# Patient Record
Sex: MALE | Race: WHITE | URBAN - METROPOLITAN AREA
[De-identification: names, ages, dates, MRNs, and addresses within clinical notes are randomized per-mention and may not be internally consistent; named-entity substitution may affect disease eponyms.]

---

## 2017-11-13 PROBLEM — F10.10 ALCOHOL ABUSE: Status: ACTIVE | Noted: 2017-11-13

## 2017-11-13 PROBLEM — Z72.0 TOBACCO ABUSE: Status: ACTIVE | Noted: 2017-11-13

## 2017-11-13 PROBLEM — R10.11 RUQ ABDOMINAL PAIN: Status: ACTIVE | Noted: 2017-11-13

## 2017-11-15 PROBLEM — K26.5 PERFORATED DUODENAL ULCER (HCC): Status: ACTIVE | Noted: 2017-11-15

## 2017-11-15 PROBLEM — K82.8 BILIARY DYSKINESIA: Status: ACTIVE | Noted: 2017-11-15

## 2017-11-27 ENCOUNTER — TELEPHONE (OUTPATIENT)
Dept: SURGERY | Age: 52
End: 2017-11-27

## 2017-11-27 RX ORDER — OXYCODONE HYDROCHLORIDE AND ACETAMINOPHEN 5; 325 MG/1; MG/1
1 TABLET ORAL EVERY 6 HOURS PRN
Qty: 20 TABLET | Refills: 0 | Status: SHIPPED | OUTPATIENT
Start: 2017-11-27

## 2017-11-29 ENCOUNTER — OFFICE VISIT (OUTPATIENT)
Dept: SURGERY | Age: 52
End: 2017-11-29

## 2017-11-29 VITALS
WEIGHT: 128 LBS | DIASTOLIC BLOOD PRESSURE: 78 MMHG | HEIGHT: 66 IN | BODY MASS INDEX: 20.57 KG/M2 | SYSTOLIC BLOOD PRESSURE: 116 MMHG | HEART RATE: 76 BPM

## 2017-11-29 DIAGNOSIS — F10.10 ALCOHOL ABUSE: ICD-10-CM

## 2017-11-29 DIAGNOSIS — Z72.0 TOBACCO ABUSE: ICD-10-CM

## 2017-11-29 DIAGNOSIS — K26.5 PERFORATED DUODENAL ULCER (HCC): Primary | ICD-10-CM

## 2017-11-29 PROCEDURE — 99024 POSTOP FOLLOW-UP VISIT: CPT | Performed by: SURGERY

## 2017-11-29 RX ORDER — OMEPRAZOLE 20 MG/1
20 TABLET, DELAYED RELEASE ORAL DAILY
Qty: 30 TABLET | Refills: 5 | Status: SHIPPED | OUTPATIENT
Start: 2017-11-29 | End: 2018-07-19 | Stop reason: SDUPTHER

## 2017-11-29 NOTE — PROGRESS NOTES
Subjective:      Patient ID: Albaro De La Paz is a 45 y.o. male. HPI  S/p repair perf duodenal ulcer, open kristi. Has been doing well. Took augmentin. Unable to fill protonix due to insurance issues. Has stopped smoking and drinking     Review of Systems    Objective:   Physical Exam  RUQ incision well healed. Small amount serous drainage from central wound    Assessment:      1. Perforated duodenal ulcer ([address]     2. Tobacco abuse     3. Alcohol abuse           Plan:      Generic omeprazole Rx given.   May need peer to peer  Needs at least 6 months of PPI  Continue smoking and EtOH abstinence  Cont light lifting another 4 weeks

## 2017-12-21 ENCOUNTER — TELEPHONE (OUTPATIENT)
Dept: SURGERY | Age: 52
End: 2017-12-21

## 2017-12-21 NOTE — TELEPHONE ENCOUNTER
Patient would like to have a call regarding his [employer] paperwork. He would like to know if it has been faxed yet.   He states he dropped it off of 12/13

## 2017-12-21 NOTE — TELEPHONE ENCOUNTER
Spoke with patient papers at the  notified will patient signature and contact billing department for itemized bill.

## 2018-01-08 ENCOUNTER — TELEPHONE (OUTPATIENT)
Dept: SURGERY | Age: 53
End: 2018-01-08

## 2018-02-13 ENCOUNTER — TELEPHONE (OUTPATIENT)
Dept: SURGERY | Age: 53
End: 2018-02-13

## 2018-02-13 NOTE — TELEPHONE ENCOUNTER
Spoke with [employer] dates added and re-faxed to them. Also spoke with patient wife notified forms were corrected and faxed.

## 2018-07-25 RX ORDER — OMEPRAZOLE 20 MG/1
CAPSULE, DELAYED RELEASE ORAL
Qty: 30 CAPSULE | Refills: 4 | Status: SHIPPED | OUTPATIENT
Start: 2018-07-25 | End: 2019-02-03 | Stop reason: SDUPTHER

## 2019-02-05 RX ORDER — OMEPRAZOLE 20 MG/1
CAPSULE, DELAYED RELEASE ORAL
Qty: 30 CAPSULE | Refills: 3 | Status: SHIPPED | OUTPATIENT
Start: 2019-02-05

## 2022-07-06 NOTE — TELEPHONE ENCOUNTER
Patient picked up RX yesterday.
Patient would like to have a refill on his pain medication. He is out. He was also supposed to receive another medication but he never did. Call and advise.   I told the patient Hawa Vinson was not in office today and he would like to have another call from someone else regarding this issue
22